# Patient Record
Sex: FEMALE | Race: WHITE | NOT HISPANIC OR LATINO | Employment: UNEMPLOYED | URBAN - METROPOLITAN AREA
[De-identification: names, ages, dates, MRNs, and addresses within clinical notes are randomized per-mention and may not be internally consistent; named-entity substitution may affect disease eponyms.]

---

## 2023-08-14 ENCOUNTER — OFFICE VISIT (OUTPATIENT)
Dept: PRIMARY CARE | Facility: CLINIC | Age: 53
End: 2023-08-14
Payer: COMMERCIAL

## 2023-08-14 VITALS — SYSTOLIC BLOOD PRESSURE: 126 MMHG | DIASTOLIC BLOOD PRESSURE: 72 MMHG

## 2023-08-14 DIAGNOSIS — Z12.11 COLON CANCER SCREENING: ICD-10-CM

## 2023-08-14 DIAGNOSIS — J01.00 ACUTE NON-RECURRENT MAXILLARY SINUSITIS: Primary | ICD-10-CM

## 2023-08-14 PROCEDURE — 99213 OFFICE O/P EST LOW 20 MIN: CPT | Performed by: FAMILY MEDICINE

## 2023-08-14 RX ORDER — AZITHROMYCIN 250 MG/1
TABLET, FILM COATED ORAL
Qty: 6 TABLET | Refills: 0 | Status: SHIPPED | OUTPATIENT
Start: 2023-08-14 | End: 2023-08-19

## 2023-08-14 NOTE — PROGRESS NOTES
Subjective   Patient ID: Jessica Solorzano is a 52 y.o. female who presents for sinus congestion for 2 days  HPI   Patient started to have sinus congestion, facial ache, yellowish nasal discharge and mild dull headache 2 days ago. Patient also had cough with yellow phlegm. Normal hearing. No body aches, loss of smell/tastes,  fever, chills. Patient denies having shortness of breath, wheezing, chest pain, heart palpitation. No  nausea, vomiting or abdominal pain. Patient had normal appetite. No stiff neck, dizziness or imbalance. Symptoms persisted today.         Review of Systems    Objective   /72     Physical Exam  Mild distress. Eyes: EMOI, No conjunctival erythema. Rt  maxillary  area tenderness upon palpation. There was yellowish nasal discharge. Mildly erythematous pharynx. No cervical lymph node tenderness or enlargement. Neck is supple. Lungs: CTA b/l, Heart: RRR. Abdomen: soft, no tenderness. Bowel sound: normal. Patient walks with a good balance.      Assessment/Plan        Acute sinusitis: Antibiotics as above. Saline nasal spray. Tylenol prn for fever or ache. Increase fluid intake. If symptoms do not improve in 3-4  days, call office.

## 2023-10-27 ENCOUNTER — OFFICE VISIT (OUTPATIENT)
Dept: PRIMARY CARE | Facility: CLINIC | Age: 53
End: 2023-10-27
Payer: COMMERCIAL

## 2023-10-27 VITALS — RESPIRATION RATE: 14 BRPM | SYSTOLIC BLOOD PRESSURE: 131 MMHG | DIASTOLIC BLOOD PRESSURE: 66 MMHG | HEART RATE: 76 BPM

## 2023-10-27 DIAGNOSIS — J01.00 ACUTE NON-RECURRENT MAXILLARY SINUSITIS: Primary | ICD-10-CM

## 2023-10-27 DIAGNOSIS — Z12.31 BREAST CANCER SCREENING BY MAMMOGRAM: ICD-10-CM

## 2023-10-27 PROCEDURE — 99213 OFFICE O/P EST LOW 20 MIN: CPT | Performed by: FAMILY MEDICINE

## 2023-10-27 RX ORDER — AZITHROMYCIN 250 MG/1
TABLET, FILM COATED ORAL
Qty: 6 TABLET | Refills: 0 | Status: SHIPPED | OUTPATIENT
Start: 2023-10-27 | End: 2023-11-01

## 2023-10-27 NOTE — PROGRESS NOTES
Subjective   Patient ID: Jessica Solorzano is a 52 y.o. female who presents for sinus    HPI   Patient started to have sinus congestion, facial ache, yellowish nasal discharge and mild dull headache 4 days ago. Patient also had cough. Normal hearing. No body aches, loss of smell/tastes,  fever, chills. Patient denies having shortness of breath, wheezing, chest pain, heart palpitation. No  nausea, vomiting or abdominal pain. Patient had decreased  appetite. No stiff neck, rt hearing loss with dizziness. Symptoms persisted today.         Review of Systems    Objective   /66   Pulse 76   Resp 14     Physical Exam  Mild distress. Eyes: EMOI, No conjunctival erythema. Rt   maxillary  area tenderness upon palpation. There was yellowish nasal discharge. Mildly erythematous pharynx. No cervical lymph node tenderness or enlargement. Neck is supple. Lungs: CTA b/l, Heart: RRR. Abdomen: soft, no tenderness. Bowel sound: normal. Patient walks with a good balance.      Assessment/Plan     Acute sinusitis: Antibiotics as above. Saline nasal spray. Tylenol prn for fever or ache. Increase fluid intake. If symptoms do not improve in 3-4  days, call office.

## 2024-01-10 ENCOUNTER — APPOINTMENT (OUTPATIENT)
Dept: RADIOLOGY | Facility: HOSPITAL | Age: 54
End: 2024-01-10
Payer: COMMERCIAL

## 2024-01-11 ENCOUNTER — HOSPITAL ENCOUNTER (OUTPATIENT)
Dept: RADIOLOGY | Facility: HOSPITAL | Age: 54
Discharge: HOME | End: 2024-01-11
Payer: COMMERCIAL

## 2024-01-11 DIAGNOSIS — Z12.11 ENCOUNTER FOR SCREENING FOR MALIGNANT NEOPLASM OF COLON: Primary | ICD-10-CM

## 2024-01-11 DIAGNOSIS — Z12.31 BREAST CANCER SCREENING BY MAMMOGRAM: ICD-10-CM

## 2024-01-11 PROCEDURE — 77067 SCR MAMMO BI INCL CAD: CPT | Performed by: RADIOLOGY

## 2024-01-11 PROCEDURE — 77063 BREAST TOMOSYNTHESIS BI: CPT | Performed by: RADIOLOGY

## 2024-01-11 PROCEDURE — 77067 SCR MAMMO BI INCL CAD: CPT

## 2024-01-16 ENCOUNTER — TELEMEDICINE (OUTPATIENT)
Dept: PRIMARY CARE | Facility: CLINIC | Age: 54
End: 2024-01-16
Payer: COMMERCIAL

## 2024-01-16 DIAGNOSIS — J01.00 ACUTE NON-RECURRENT MAXILLARY SINUSITIS: Primary | ICD-10-CM

## 2024-01-16 PROCEDURE — 99213 OFFICE O/P EST LOW 20 MIN: CPT | Performed by: FAMILY MEDICINE

## 2024-01-16 RX ORDER — AZITHROMYCIN 250 MG/1
TABLET, FILM COATED ORAL
Qty: 6 TABLET | Refills: 0 | Status: SHIPPED | OUTPATIENT
Start: 2024-01-16 | End: 2024-01-21

## 2024-01-16 RX ORDER — BENZONATATE 200 MG/1
200 CAPSULE ORAL 3 TIMES DAILY PRN
Qty: 42 CAPSULE | Refills: 0 | Status: SHIPPED | OUTPATIENT
Start: 2024-01-16 | End: 2024-02-15

## 2024-01-16 NOTE — PROGRESS NOTES
Subjective   Patient ID: Jessica Solorzano is a 53 y.o. female who presents for sinus for 2 weeks    HPI   Patient started to have sinus congestion, facial ache, greenish  nasal discharge and mild dull headache 14 days ago. Patient also had cough with yellow phlegm. Normal hearing. No body aches, loss of smell/tastes,  fever, chills. Patient denies having shortness of breath. +wheezing, no chest pain, heart palpitation. No  nausea, vomiting or abdominal pain. Patient had decreased  appetite. No stiff neck or imbalance. Symptoms persisted today.         Review of Systems    Objective   There were no vitals taken for this visit.    Physical Exam    Assessment/Plan     Acute sinusitis: Antibiotics as above. Saline nasal spray. Tylenol prn for fever or ache. Increase fluid intake. If symptoms do not improve in 3-4  days, call office.

## 2024-09-30 ENCOUNTER — OFFICE VISIT (OUTPATIENT)
Dept: PRIMARY CARE | Facility: CLINIC | Age: 54
End: 2024-09-30
Payer: COMMERCIAL

## 2024-09-30 VITALS — RESPIRATION RATE: 14 BRPM | SYSTOLIC BLOOD PRESSURE: 123 MMHG | DIASTOLIC BLOOD PRESSURE: 76 MMHG | HEART RATE: 76 BPM

## 2024-09-30 DIAGNOSIS — Z12.11 COLON CANCER SCREENING: ICD-10-CM

## 2024-09-30 DIAGNOSIS — J01.00 ACUTE NON-RECURRENT MAXILLARY SINUSITIS: Primary | ICD-10-CM

## 2024-09-30 PROCEDURE — 99213 OFFICE O/P EST LOW 20 MIN: CPT | Performed by: FAMILY MEDICINE

## 2024-09-30 RX ORDER — BENZONATATE 200 MG/1
200 CAPSULE ORAL 3 TIMES DAILY PRN
Qty: 42 CAPSULE | Refills: 0 | Status: SHIPPED | OUTPATIENT
Start: 2024-09-30 | End: 2024-10-30

## 2024-09-30 RX ORDER — AZITHROMYCIN 250 MG/1
TABLET, FILM COATED ORAL
Qty: 6 TABLET | Refills: 0 | Status: SHIPPED | OUTPATIENT
Start: 2024-09-30 | End: 2024-10-05

## 2024-09-30 NOTE — ASSESSMENT & PLAN NOTE
Acute sinusitis: Antibiotics as above. Saline nasal spray. Tylenol prn for fever or ache. Increase fluid intake. If symptoms do not improve in 3-4  days, call office.    Orders:    azithromycin (Zithromax) 250 mg tablet; Take 2 tablets (500 mg) by mouth once daily for 1 day, THEN 1 tablet (250 mg) once daily for 4 days. Take 2 tabs (500 mg) by mouth today, than 1 daily for 4 days..    benzonatate (Tessalon) 200 mg capsule; Take 1 capsule (200 mg) by mouth 3 times a day as needed for cough. Do not crush or chew.

## 2024-09-30 NOTE — PROGRESS NOTES
Subjective   Patient ID: Jessica Solorzano is a 53 y.o. female who presents for sinus for 1 week    HPI   Patient started to have sinus congestion, facial ache, nasal discharge and mild dull headache 7 days ago. Patient also had cough with phlegm. Normal hearing. No body aches, loss of smell/tastes,  fever, chills. Patient denies having shortness of breath, wheezing, chest pain, heart palpitation. No  nausea, vomiting or abdominal pain. Patient had normal appetite. No stiff neck, dizziness or imbalance. Symptoms persisted today.         Review of Systems    Objective   /76   Pulse 76   Resp 14     Physical Exam  Mild distress. Eyes: EMOI, No conjunctival erythema. Left  maxillary  area tenderness upon palpation. There was yellowish nasal discharge. Mildly erythematous pharynx. No cervical lymph node tenderness or enlargement. Neck is supple. Lungs: CTA b/l, Heart: RRR. Abdomen: soft, no tenderness. Bowel sound: normal. Patient walks with a good balance.      Assessment/Plan   Assessment & Plan  Colon cancer screening    Orders:    Fecal Occult Blood Immunoassay; Future    Acute non-recurrent maxillary sinusitis  Acute sinusitis: Antibiotics as above. Saline nasal spray. Tylenol prn for fever or ache. Increase fluid intake. If symptoms do not improve in 3-4  days, call office.    Orders:    azithromycin (Zithromax) 250 mg tablet; Take 2 tablets (500 mg) by mouth once daily for 1 day, THEN 1 tablet (250 mg) once daily for 4 days. Take 2 tabs (500 mg) by mouth today, than 1 daily for 4 days..    benzonatate (Tessalon) 200 mg capsule; Take 1 capsule (200 mg) by mouth 3 times a day as needed for cough. Do not crush or chew.

## 2024-10-14 ENCOUNTER — APPOINTMENT (OUTPATIENT)
Dept: PRIMARY CARE | Facility: CLINIC | Age: 54
End: 2024-10-14
Payer: COMMERCIAL

## 2024-11-13 ENCOUNTER — OFFICE VISIT (OUTPATIENT)
Dept: PRIMARY CARE | Facility: CLINIC | Age: 54
End: 2024-11-13
Payer: COMMERCIAL

## 2024-11-13 VITALS — SYSTOLIC BLOOD PRESSURE: 126 MMHG | RESPIRATION RATE: 15 BRPM | DIASTOLIC BLOOD PRESSURE: 75 MMHG

## 2024-11-13 DIAGNOSIS — J01.00 ACUTE NON-RECURRENT MAXILLARY SINUSITIS: Primary | ICD-10-CM

## 2024-11-13 PROCEDURE — 99213 OFFICE O/P EST LOW 20 MIN: CPT | Performed by: FAMILY MEDICINE

## 2024-11-13 RX ORDER — AZITHROMYCIN 250 MG/1
TABLET, FILM COATED ORAL
Qty: 6 TABLET | Refills: 0 | Status: SHIPPED | OUTPATIENT
Start: 2024-11-13 | End: 2024-11-18

## 2024-11-13 RX ORDER — BENZONATATE 200 MG/1
200 CAPSULE ORAL 3 TIMES DAILY PRN
Qty: 42 CAPSULE | Refills: 0 | Status: SHIPPED | OUTPATIENT
Start: 2024-11-13 | End: 2024-12-13

## 2024-11-13 NOTE — ASSESSMENT & PLAN NOTE
Acute sinusitis: Antibiotics as above. Saline nasal spray. Tylenol prn for fever or ache. Increase fluid intake. If symptoms do not improve in 3-4  days, call office.  Rtc for cpe  Orders:    azithromycin (Zithromax) 250 mg tablet; Take 2 tablets (500 mg) by mouth once daily for 1 day, THEN 1 tablet (250 mg) once daily for 4 days. Take 2 tabs (500 mg) by mouth today, than 1 daily for 4 days..    benzonatate (Tessalon) 200 mg capsule; Take 1 capsule (200 mg) by mouth 3 times a day as needed for cough. Do not crush or chew.

## 2024-11-13 NOTE — PROGRESS NOTES
Subjective   Patient ID: Jessica Solorzano is a 53 y.o. female who presents for sinus and cough for 1 week    HPI   Patient started to have sinus congestion, facial ache, nasal discharge and mild dull headache 7  days ago. Patient also had cough with yellow phlegm. Normal hearing. Patient denies having shortness of breath, wheezing, chest pain, heart palpitation. No  nausea, vomiting or abdominal pain. Patient had decreased  appetite. No stiff neck, dizziness or imbalance. Symptoms persisted today.         Review of Systems    Objective   /75   Resp 15     Physical Exam  Mild distress. Eyes: EMOI, No conjunctival erythema. Left  maxillary  area tenderness upon palpation. There was yellowish nasal discharge. Mildly erythematous pharynx. No cervical lymph node tenderness or enlargement. Neck is supple. Lungs: CTA b/l, Heart: RRR. Abdomen: soft, no tenderness. Bowel sound: normal. Patient walks with a good balance.      Assessment/Plan   Assessment & Plan  Acute non-recurrent maxillary sinusitis  Acute sinusitis: Antibiotics as above. Saline nasal spray. Tylenol prn for fever or ache. Increase fluid intake. If symptoms do not improve in 3-4  days, call office.  Rtc for cpe  Orders:    azithromycin (Zithromax) 250 mg tablet; Take 2 tablets (500 mg) by mouth once daily for 1 day, THEN 1 tablet (250 mg) once daily for 4 days. Take 2 tabs (500 mg) by mouth today, than 1 daily for 4 days..    benzonatate (Tessalon) 200 mg capsule; Take 1 capsule (200 mg) by mouth 3 times a day as needed for cough. Do not crush or chew.

## 2025-01-28 ENCOUNTER — APPOINTMENT (OUTPATIENT)
Dept: PRIMARY CARE | Facility: CLINIC | Age: 55
End: 2025-01-28
Payer: COMMERCIAL

## 2025-01-28 VITALS — SYSTOLIC BLOOD PRESSURE: 123 MMHG | DIASTOLIC BLOOD PRESSURE: 66 MMHG | RESPIRATION RATE: 14 BRPM

## 2025-01-28 DIAGNOSIS — J02.9 SORE THROAT: Primary | ICD-10-CM

## 2025-01-28 DIAGNOSIS — Z12.11 COLON CANCER SCREENING: ICD-10-CM

## 2025-01-28 PROCEDURE — 1036F TOBACCO NON-USER: CPT | Performed by: FAMILY MEDICINE

## 2025-01-28 PROCEDURE — 99213 OFFICE O/P EST LOW 20 MIN: CPT | Performed by: FAMILY MEDICINE

## 2025-01-28 RX ORDER — AZITHROMYCIN 250 MG/1
TABLET, FILM COATED ORAL
Qty: 6 TABLET | Refills: 0 | Status: SHIPPED | OUTPATIENT
Start: 2025-01-28 | End: 2025-02-02

## 2025-01-28 ASSESSMENT — PATIENT HEALTH QUESTIONNAIRE - PHQ9
1. LITTLE INTEREST OR PLEASURE IN DOING THINGS: NOT AT ALL
SUM OF ALL RESPONSES TO PHQ9 QUESTIONS 1 AND 2: 0
2. FEELING DOWN, DEPRESSED OR HOPELESS: NOT AT ALL

## 2025-01-28 NOTE — PROGRESS NOTES
Subjective   Patient ID: Jessica Solorzano is a 54 y.o. female who presents for sore throat for a few days    HPI   Patient started to have nasal congestion, clear nasal discharge, sore throat  3 days ago. pt had mild  trouble swallowing but no choking. Patient had no cough. No fever, chills, ear ache, shortness of breath, wheezing, chest pain, heart palpitation, nausea, vomiting or abdominal pain. Patient had normal appetite. No headache, stiff neck, dizziness or imbalance. Symptoms did  not get better today. + hx of strep exposure    Review of Systems    Objective   /66   Resp 14     Physical Exam  No  distress. Eyes: PERRLA. No conjunctival erythema. No sinus tenderness upon palpation. There was clear nasal discharge. Mildly erythematous pharynx.  + mild  tonsil enlargement but no exudates. TM was normal bilaterally, no  b/l cervical lymph node tenderness or enlargement. Neck is supple. Lungs: CTA b/l, Heart: RRR, Abdomen: soft, non tenderness. Bowel sound: normal. Patient walks with a good balance. no skin rashes    Assessment/Plan   Assessment & Plan  Colon cancer screening    Orders:    Cologuard® colon cancer screening; Future    Sore throat   + hx of strep exposure.  abx as above.   saline gargle throat and tylenol po prn  for fever or chills. Increase fluid intake. call office if symptoms do not improve in 3-4 days.  Rtc for cpe  Orders:    azithromycin (Zithromax) 250 mg tablet; Take 2 tablets (500 mg) by mouth once daily for 1 day, THEN 1 tablet (250 mg) once daily for 4 days. Take 2 tabs (500 mg) by mouth today, than 1 daily for 4 days..